# Patient Record
Sex: FEMALE | URBAN - METROPOLITAN AREA
[De-identification: names, ages, dates, MRNs, and addresses within clinical notes are randomized per-mention and may not be internally consistent; named-entity substitution may affect disease eponyms.]

---

## 2021-10-14 ENCOUNTER — APPOINTMENT (RX ONLY)
Dept: URBAN - METROPOLITAN AREA TELEMEDICINE 2 | Facility: TELEMEDICINE | Age: 41
Setting detail: DERMATOLOGY
End: 2021-10-14

## 2021-10-14 DIAGNOSIS — L30.4 ERYTHEMA INTERTRIGO: ICD-10-CM

## 2021-10-14 PROBLEM — L30.9 DERMATITIS, UNSPECIFIED: Status: ACTIVE | Noted: 2021-10-14

## 2021-10-14 PROCEDURE — ? PATIENT SPECIFIC COUNSELING

## 2021-10-14 PROCEDURE — ? PRESCRIPTION MEDICATION MANAGEMENT

## 2021-10-14 PROCEDURE — ? COUNSELING

## 2021-10-14 PROCEDURE — ? PRESCRIPTION

## 2021-10-14 PROCEDURE — 99203 OFFICE O/P NEW LOW 30 MIN: CPT

## 2021-10-14 RX ORDER — TRIAMCINOLONE ACETONIDE 1 MG/G
OINTMENT TOPICAL BID
Qty: 30 | Refills: 1 | Status: ERX | COMMUNITY
Start: 2021-10-14

## 2021-10-14 RX ADMIN — TRIAMCINOLONE ACETONIDE 1: 1 OINTMENT TOPICAL at 00:00

## 2021-10-14 ASSESSMENT — LOCATION DETAILED DESCRIPTION DERM: LOCATION DETAILED: LEFT LABIUM MINUS

## 2021-10-14 ASSESSMENT — LOCATION SIMPLE DESCRIPTION DERM: LOCATION SIMPLE: LABIA MINORA

## 2021-10-14 ASSESSMENT — LOCATION ZONE DERM: LOCATION ZONE: VULVA

## 2021-10-14 NOTE — PROCEDURE: PRESCRIPTION MEDICATION MANAGEMENT
Plan: May consider Protopic ointment for maintenance if needed. Will follow up in two weeks.
Initiate Treatment: triamcinolone acetonide 0.1 % topical ointment Bid\\nQuantity: 30.0 g  Days Supply: 30\\nSig: Apply thin layer to affected areas twice daily as needed for up to two weeks per month maximum
Detail Level: Zone
Render In Strict Bullet Format?: No

## 2024-01-18 NOTE — PROCEDURE: PATIENT SPECIFIC COUNSELING
01/18/2024   [facility] myEDmatch  N/A  For questions about this resource list or additional care needs, please contact your primary care clinic or care manager.  Phone: [phone]   Email: N/A   Address: [address] [place] [id number]   Hours: N/A        Financial Stability       Rent and mortgage payment assistance  1  Neighborhood Assistance [employer] ([employer] - Home Save Program Distance: 3.8 miles      Phone/Virtual   [address] [place] [address]  Language: English, Slovenian  Hours: Mon - Fri 9:00 AM - 5:00 PM  Fees: Free   Phone: (207) 449-3804 Email: services@[employer] Website: https://www.[employer]     2  [facility]  Office - [facility] Homeless Prevention Assistance Project (FHPAP) Distance: 5.15 miles      Phone/Virtual   [address] [place] [address]  Language: English  Hours: Mon - Fri 8:30 AM - 12:00 PM , Mon - Fri 1:00 PM - 4:00 PM  Fees: Free   Phone: (717) 674-5775 Email: heena@[facility].org Website: https://www.[employer].org/usn/          Important Numbers & Websites       Emergency Services   911  [facility]   311  Poison Control   (324) 270-6702  Suicide Prevention Lifeline   (416) 750-4331 (TALK)  Child Abuse Hotline   (209) 630-8850 (4-A-Child)  Sexual Assault Hotline   (628) 936-8654 (HOPE)  National Runaway Safeline   (585) 860-6676 (RUNAWAY)  All-Options Talkline   (811) 785-6201  Substance Abuse Referral   (156) 160-4634 (HELP)    
Detail Level: Zone